# Patient Record
Sex: FEMALE | Race: WHITE | Employment: OTHER | URBAN - NONMETROPOLITAN AREA
[De-identification: names, ages, dates, MRNs, and addresses within clinical notes are randomized per-mention and may not be internally consistent; named-entity substitution may affect disease eponyms.]

---

## 2018-07-11 ENCOUNTER — HOSPITAL ENCOUNTER (EMERGENCY)
Facility: OTHER | Age: 68
Discharge: HOME | End: 2018-07-11
Attending: FAMILY MEDICINE | Admitting: FAMILY MEDICINE
Payer: COMMERCIAL

## 2018-07-11 ENCOUNTER — APPOINTMENT (OUTPATIENT)
Dept: ULTRASOUND IMAGING | Facility: OTHER | Age: 68
End: 2018-07-11
Attending: FAMILY MEDICINE
Payer: COMMERCIAL

## 2018-07-11 ENCOUNTER — APPOINTMENT (OUTPATIENT)
Dept: GENERAL RADIOLOGY | Facility: OTHER | Age: 68
End: 2018-07-11
Attending: FAMILY MEDICINE
Payer: COMMERCIAL

## 2018-07-11 VITALS
RESPIRATION RATE: 12 BRPM | DIASTOLIC BLOOD PRESSURE: 54 MMHG | SYSTOLIC BLOOD PRESSURE: 138 MMHG | HEART RATE: 60 BPM | OXYGEN SATURATION: 96 % | HEIGHT: 65 IN | TEMPERATURE: 97.6 F

## 2018-07-11 DIAGNOSIS — M79.89 LEFT LEG SWELLING: ICD-10-CM

## 2018-07-11 PROCEDURE — 73562 X-RAY EXAM OF KNEE 3: CPT | Mod: LT

## 2018-07-11 PROCEDURE — 99283 EMERGENCY DEPT VISIT LOW MDM: CPT | Mod: Z6 | Performed by: FAMILY MEDICINE

## 2018-07-11 PROCEDURE — 93971 EXTREMITY STUDY: CPT | Mod: LT

## 2018-07-11 PROCEDURE — 99284 EMERGENCY DEPT VISIT MOD MDM: CPT | Mod: 25 | Performed by: FAMILY MEDICINE

## 2018-07-11 ASSESSMENT — ENCOUNTER SYMPTOMS
ADENOPATHY: 0
HEADACHES: 0
CHEST TIGHTNESS: 0
CHILLS: 0
FEVER: 0
LIGHT-HEADEDNESS: 0
DIFFICULTY URINATING: 0
FACIAL ASYMMETRY: 0
DYSURIA: 0
PHOTOPHOBIA: 0
SHORTNESS OF BREATH: 0

## 2018-07-11 NOTE — ED AVS SNAPSHOT
[facility]    [address]    [place] [address]    Phone:  [phone]    Fax:  [phone]                                       Anat Howell   MRN: [id number]    Department:  [facility]   Date of Visit:  7/11/2018           Patient Information     Date Of Birth          1950        Your diagnoses for this visit were:     Left leg swelling        You were seen by Dante Crowley MD.      Follow-up Information     Follow up with No Ref-Primary, Physician.    Why:  As needed        Follow up with [facility].    Specialty:  EMERGENCY MEDICINE    Why:  If symptoms worsen    Contact information:    [address]  [place] [address] [phone]        Discharge Instructions         Leg Swelling in a Single Leg  Swelling of the arms, feet, ankles, and legs is called edema. It is caused by extra fluid collecting in the tissues. Because of gravity, extra fluid in the body settles to the lowest part. That is why the legs and feet are most affected. You have swelling in a single leg.  Some of the causes for swelling in only a single leg include:    Infection in the foot or leg    Long-term problem with a vein not working well (venous insufficiency)    Swollen, twisted vein in the leg (varicose veins)    Insect bite or sting on the foot or leg    Injury or recent surgery on the foot or leg    Blood clot in a deep vein of the leg (deep vein thrombosis or DVT)    Inflammation of the joints of the lower leg  Medical treatment will depend on what is causing your swelling.  Home care  Follow these guidelines when caring for yourself at home:    Don t wear tight clothing.    Keep your legs up while lying or sitting.    Take any medicines as directed.    If infection, injury, or recent surgery is the cause of your swelling, stay off your legs as much as possible until your symptoms get better.    If you have venous  insufficiency or varicose veins, don t sit or  one place for long periods of time. Take breaks and walk around every few hours. Talk with your healthcare provider about wearing support stockings to help lessen swelling during the day.    Wear compression stockings with your doctor's approval  Follow-up care  Follow up with your healthcare provider as advised.  Call 911  Call 911 if any of these occur:    Shortness of breath or trouble breathing    Chest pain    Coughing up blood    Fainting or loss of consciousness   When to seek medical advice  Call your healthcare provider right away if any of these occur:    Increased pain, swelling, warmth, or redness of the leg, ankle, or foot    Fever of 100.4 F (38 C) or higher, or as directed by your healthcare provider    Weakness or dizziness    Shaking chills    Drenching sweats  Date Last Reviewed: 4/11/2016 2000-2017 [employer]. [address] [place] [address]. All rights reserved. This information is not intended as a substitute for professional medical care. Always follow your healthcare professional's instructions.          24 Hour Appointment Hotline     To schedule an appointment at [facility], please call [phone]. If you don't have a family doctor or clinic, we will help you find one. [facility] are conveniently located to serve the needs of you and your family.           Review of your medicines      Notice     You have not been prescribed any medications.            Procedures and tests performed during your visit     US Lower Extremity Venous Duplex Left    XR Knee Left 3 Views      Orders Needing Specimen Collection     None      Pending Results     No orders found from 7/9/2018 to 7/12/2018.            Pending Culture Results     No orders found from 7/9/2018 to 7/12/2018.            Pending Results Instructions     If you had any lab results that were not finalized at the time of your Discharge, you can call  "the ED Lab Result RN at [phone]. You will be contacted by this team for any positive Lab results or changes in treatment. The nurses are available 7 days a week from 10A to 6:30P.  You can leave a message 24 hours per day and they will return your call.        Thank you for choosing [facility]       Thank you for choosing [facility] for your care. Our goal is always to provide you with excellent care. Hearing back from our patients is one way we can continue to improve our services. Please take a few minutes to complete the written survey that you may receive in the mail after you visit with us. Thank you!        [employer] Information     [employer] lets you send messages to your doctor, view your test results, renew your prescriptions, schedule appointments and more. To sign up, go to [email or web address] . Click on \"Log in\" on the left side of the screen, which will take you to the Welcome page. Then click on \"Sign up Now\" on the right side of the page.     You will be asked to enter the access code listed below, as well as some personal information. Please follow the directions to create your username and password.     Your access code is: [email or web address]  Expires: 10/9/2018  4:15 PM     Your access code will  in 90 days. If you need help or a new code, please call your [facility] clinic or [phone].        Care EveryWhere ID     This is your Care EveryWhere ID. This could be used by other organizations to access your [facility] medical records  [id number]        Equal Access to Services     KAIDEN NEUMANN : Hadii thaddeus lazo hadasho Soruddyali, waaxda luqadaha, qaybta kaalmada adelamyada, schuyler brunner . So [facility] [phone].    ATENCIÓN: Si habla español, tiene a gardner disposición servicios gratuitos de asistencia lingüística. Llame al [phone].    We comply with applicable federal civil rights laws and [place] laws. We do not discriminate on the basis of race, color, national origin, " age, disability, sex, sexual orientation, or gender identity.            After Visit Summary       This is your record. Keep this with you and show to your community pharmacist(s) and doctor(s) at your next visit.

## 2018-07-11 NOTE — ED TRIAGE NOTES
"ED Nursing Triage Note (General)   ________________________________    Anat LOLY Howell is a 68 year old Female that presents to triage private car  With history of  Twisting her knee about a week ago.  Now is having pain behind knee with swelling from her left knee down and into her foot.  Has chronic neuropathy in feet due to diabetes, reported by spouse/SO  Significant symptoms had onset 1 week(s) ago.  /63  Pulse 72  Temp 97.6  F (36.4  C) (Tympanic)  Resp 12  Ht 1.651 m (5' 5\")  SpO2 94%t  Patient appears alert , in mild distress., and cooperative behavior.  Breathing noted as Normal.  Circulation Normal  Skin normal  Action taken:  Triage to critical care immediately      PRE HOSPITAL PRIOR LIVING SITUATION Spouse    COLUMBIA-SUICIDE SEVERITY RATING SCALE   Screen with Triage Points for Emergency Department      Ask questions that are bolded and underlined.   Past  month   Ask Questions 1 and 2 YES NO   1)  Have you wished you were dead or wished you could go to sleep and not wake up?   x   2)  Have you actually had any thoughts of killing yourself?   x   If YES to 2, ask questions 3, 4, 5, and 6.  If NO to 2, go directly to question 6.   3)  Have you been thinking about how you might do this?   E.g.  I thought about taking an overdose but I never made a specific plan as to when where or how I would actually do it .and I would never go through with it.       4)  Have you had these thoughts and had some intention of acting on them?   As opposed to  I have the thoughts but I definitely will not do anything about them.       5)  Have you started to work out or worked out the details of how to kill yourself? Do you intend to carry out this plan?      6)  Have you ever done anything, started to do anything, or prepared to do anything to end your life?  Examples: Collected pills, obtained a gun, gave away valuables, wrote a will or suicide note, took out pills but didn t swallow any, held a gun but changed " your mind or it was grabbed from your hand, went to the roof but didn t jump; or actually took pills, tried to shoot yourself, cut yourself, tried to hang yourself, etc.    If YES, ask: Was this within the past three months?  Lifetime     x    Past 3 Months        Item 1:  Behavioral Health Referral at Discharge  Item 2:  Behavioral Health Referral at Discharge   Item 3:  Behavioral Health Consult (Psychiatric Nurse/) and consider Patient Safety Precautions  Item 4:  Immediate Notification of Physician and/or Behavioral Health and Patient Safety Precautions   Item 5:  Immediate Notification of Physician and/or Behavioral Health and Patient Safety Precautions  Item 6:  Over 3 months ago: Behavioral Health Consult (Psychiatric Nurse/) and consider Patient Safety Precautions  OR  Item 6:  3 months ago or less: Immediate Notification of Physician and/or Behavioral Health and Patient Safety Precautions

## 2018-07-11 NOTE — DISCHARGE INSTRUCTIONS
Leg Swelling in a Single Leg  Swelling of the arms, feet, ankles, and legs is called edema. It is caused by extra fluid collecting in the tissues. Because of gravity, extra fluid in the body settles to the lowest part. That is why the legs and feet are most affected. You have swelling in a single leg.  Some of the causes for swelling in only a single leg include:    Infection in the foot or leg    Long-term problem with a vein not working well (venous insufficiency)    Swollen, twisted vein in the leg (varicose veins)    Insect bite or sting on the foot or leg    Injury or recent surgery on the foot or leg    Blood clot in a deep vein of the leg (deep vein thrombosis or DVT)    Inflammation of the joints of the lower leg  Medical treatment will depend on what is causing your swelling.  Home care  Follow these guidelines when caring for yourself at home:    Don t wear tight clothing.    Keep your legs up while lying or sitting.    Take any medicines as directed.    If infection, injury, or recent surgery is the cause of your swelling, stay off your legs as much as possible until your symptoms get better.    If you have venous insufficiency or varicose veins, don t sit or  one place for long periods of time. Take breaks and walk around every few hours. Talk with your healthcare provider about wearing support stockings to help lessen swelling during the day.    Wear compression stockings with your doctor's approval  Follow-up care  Follow up with your healthcare provider as advised.  Call 911  Call 911 if any of these occur:    Shortness of breath or trouble breathing    Chest pain    Coughing up blood    Fainting or loss of consciousness   When to seek medical advice  Call your healthcare provider right away if any of these occur:    Increased pain, swelling, warmth, or redness of the leg, ankle, or foot    Fever of 100.4 F (38 C) or higher, or as directed by your healthcare provider    Weakness or  dizziness    Shaking chills    Drenching sweats  Date Last Reviewed: 4/11/2016 2000-2017 [employer]. [address] [place] [address], [place] [address]. All rights reserved. This information is not intended as a substitute for professional medical care. Always follow your healthcare professional's instructions.

## 2018-07-11 NOTE — ED AVS SNAPSHOT
[facility]    [address]    [place] [address]    Phone:  [phone]    Fax:  [phone]                                       Anat Howell   MRN: [id number]    Department:  [facility]   Date of Visit:  7/11/2018           After Visit Summary Signature Page     I have received my discharge instructions, and my questions have been answered. I have discussed any challenges I see with this plan with the nurse or doctor.    ..........................................................................................................................................  Patient/Patient Representative Signature      ..........................................................................................................................................  Patient Representative Print Name and Relationship to Patient    ..................................................               ................................................  Date                                            Time    ..........................................................................................................................................  Reviewed by Signature/Title    ...................................................              ..............................................  Date                                                            Time

## 2018-07-11 NOTE — ED PROVIDER NOTES
"  History     Chief Complaint   Patient presents with     Leg Swelling     HPI  Anat Howell is a 68 year old female who since with left leg swelling.  Reviewed history below similar history related to me.  No chest pain or shortness of breath.  No nausea vomiting.      ED Nursing Triage Note (General)   ________________________________     Anat Howell is a 68 year old Female that presents to triage private car  With history of  Twisting her knee about a week ago.  Now is having pain behind knee with swelling from her left knee down and into her foot.  Has chronic neuropathy in feet due to diabetes, reported by spouse/SO  Significant symptoms had onset 1 week(s) ago.          Problem List:    There are no active problems to display for this patient.       Past Medical History:    No past medical history on file.    Past Surgical History:    No past surgical history on file.    Family History:    No family history on file.    Social History:  Marital Status:   [2]  Social History   Substance Use Topics     Smoking status: Not on file     Smokeless tobacco: Not on file     Alcohol use Not on file        Medications:      No current outpatient prescriptions on file.      Review of Systems   Constitutional: Negative for chills and fever.   HENT: Negative for congestion.    Eyes: Negative for photophobia.   Respiratory: Negative for chest tightness and shortness of breath.    Cardiovascular: Negative for chest pain.   Endocrine: Negative for polyuria.   Genitourinary: Negative for difficulty urinating, dysuria and pelvic pain.   Neurological: Negative for facial asymmetry, light-headedness and headaches.   Hematological: Negative for adenopathy.       Physical Exam   BP: 138/63  Pulse: 72  Temp: 97.6  F (36.4  C)  Resp: 12  Height: 165.1 cm (5' 5\")  SpO2: 94 %      Physical Exam   Cardiovascular: Normal rate.    No murmur heard.  Pulmonary/Chest: Effort normal and breath sounds normal.   Abdominal: Soft. Bowel " sounds are normal.   Musculoskeletal:        Left lower leg: She exhibits swelling.   Nursing note and vitals reviewed.      ED Course     ED Course     Procedures             Results for orders placed or performed during the hospital encounter of 07/11/18 (from the past 24 hour(s))   XR Knee Left 3 Views    Narrative    PROCEDURE:  XR KNEE LT 3 VW    HISTORY: knee pain;     COMPARISON:  None.    TECHNIQUE:  3 views of the left knee were obtained.    FINDINGS:  No fracture or dislocation is identified. There are  moderate tricompartmental degenerative changes including joint space  narrowing and osteophytes. Osseous density in the suprapatellar region  may represent an intra-articular fragment. Joint effusion is noted.       Impression    IMPRESSION: Moderate degenerative disease.      RIGOBERTO DAVE MD   US Lower Extremity Venous Duplex Left    Narrative    EXAM:US LOWER EXTREMITY VENOUS DUPLEX LEFT    HISTORY: leg swelling r/o DVT; .       COMPARISONS: None    TECHNIQUE: Venous duplex ultrasonography of the left lower extremity  was performed.     FINDINGS: The common femoral vein, femoral vein and popliteal vein are  fully compressible with spontaneous and augmentable venous flow.  Visualized calf veins are also patent.         Impression    IMPRESSION: Negative for DVT.    RIGOBERTO DAVE MD       Medications - No data to display    Assessments & Plan (with Medical Decision Making)     There are no discharge medications for this patient.      Final diagnoses:   Left leg swelling   It is my clinical impression she may have had a ruptured Baker's cyst.  She did have a slight effusion on x-ray.  This was not present on ultrasound but that does not definitively rule out Baker's cyst.  Recommend elevation Motrin Tylenol as needed for pain.  Compression stockings if needed.  If it is still bothering her in a few weeks she may need follow-up and consideration for MRI.  Her main complaint though is in the popliteal  region and not really in an area such as medial knee that would be concerning for meniscal tear.  Recommend conservative therapy for now and close follow-up if needed.  Patient verbalized understanding plan is in agreement.    7/11/2018   [facility]     Dante Crowley MD  07/11/18 4034